# Patient Record
Sex: FEMALE | NOT HISPANIC OR LATINO | URBAN - METROPOLITAN AREA
[De-identification: names, ages, dates, MRNs, and addresses within clinical notes are randomized per-mention and may not be internally consistent; named-entity substitution may affect disease eponyms.]

---

## 2020-07-07 ENCOUNTER — OFFICE VISIT (OUTPATIENT)
Dept: URBAN - METROPOLITAN AREA CLINIC 40 | Facility: CLINIC | Age: 35
End: 2020-07-07

## 2020-07-23 ENCOUNTER — OFFICE VISIT (OUTPATIENT)
Dept: URBAN - METROPOLITAN AREA CLINIC 40 | Facility: CLINIC | Age: 35
End: 2020-07-23
Payer: COMMERCIAL

## 2020-07-23 ENCOUNTER — WEB ENCOUNTER (OUTPATIENT)
Dept: URBAN - METROPOLITAN AREA CLINIC 40 | Facility: CLINIC | Age: 35
End: 2020-07-23

## 2020-07-23 DIAGNOSIS — R10.13 EPIGASTRIC ABDOMINAL PAIN: ICD-10-CM

## 2020-07-23 DIAGNOSIS — K21.9 GERD: ICD-10-CM

## 2020-07-23 DIAGNOSIS — R13.10 DYSPHAGIA: ICD-10-CM

## 2020-07-23 DIAGNOSIS — R14.0 BLOATING: ICD-10-CM

## 2020-07-23 DIAGNOSIS — R19.4 BOWEL HABIT CHANGES: ICD-10-CM

## 2020-07-23 PROCEDURE — G8427 DOCREV CUR MEDS BY ELIG CLIN: HCPCS | Performed by: INTERNAL MEDICINE

## 2020-07-23 PROCEDURE — 99213 OFFICE O/P EST LOW 20 MIN: CPT | Performed by: INTERNAL MEDICINE

## 2020-07-23 PROCEDURE — G8417 CALC BMI ABV UP PARAM F/U: HCPCS | Performed by: INTERNAL MEDICINE

## 2020-07-23 RX ORDER — HYDROXYZINE HYDROCHLORIDE 10 MG/1
TABLET ORAL
Qty: 0 | Refills: 0 | Status: ACTIVE | COMMUNITY
Start: 1900-01-01 | End: 1900-01-01

## 2020-07-23 RX ORDER — HYDROCHLOROTHIAZIDE 12.5 MG/1
TABLET ORAL
Qty: 0 | Refills: 0 | Status: ACTIVE | COMMUNITY
Start: 1900-01-01 | End: 1900-01-01

## 2020-07-23 RX ORDER — AMLODIPINE BESYLATE 5 MG/1
TABLET ORAL
Qty: 0 | Refills: 0 | Status: ACTIVE | COMMUNITY
Start: 1900-01-01 | End: 1900-01-01

## 2020-07-23 RX ORDER — OMEPRAZOLE 20 MG/1
1 CAPSULE 30 MINUTES BEFORE MORNING MEAL CAPSULE, DELAYED RELEASE ORAL ONCE A DAY
Qty: 30 | Refills: 1 | OUTPATIENT
Start: 1900-01-01

## 2020-07-23 RX ORDER — PANTOPRAZOLE SODIUM 40 MG
TAKE 1 TABLET (40 MG) BY ORAL ROUTE ONCE DAILY FOR 90 DAYS TABLET, DELAYED RELEASE (ENTERIC COATED) ORAL 1
OUTPATIENT
Start: 2019-10-03 | End: 2020-09-27

## 2020-07-23 RX ORDER — FAMOTIDINE 40 MG/1
TAKE 1 TABLET BY ORAL ROUTE AT NIGHT FOR 90 DAYS TABLET ORAL 1
Qty: 90 | Refills: 3 | Status: ACTIVE | COMMUNITY
Start: 2019-10-03 | End: 2020-09-27

## 2020-07-23 RX ORDER — OMEPRAZOLE 20 MG/1
CAPSULE, DELAYED RELEASE ORAL
Qty: 0 | Refills: 0 | Status: ACTIVE | COMMUNITY
Start: 1900-01-01 | End: 1900-01-01

## 2020-07-23 RX ORDER — PANTOPRAZOLE SODIUM 40 MG
TAKE 1 TABLET (40 MG) BY ORAL ROUTE ONCE DAILY FOR 90 DAYS TABLET, DELAYED RELEASE (ENTERIC COATED) ORAL 1
Qty: 90 | Refills: 3 | Status: ACTIVE | COMMUNITY
Start: 2019-10-03 | End: 2020-09-27

## 2020-07-23 RX ORDER — METOPROLOL SUCCINATE 25 MG/1
TABLET, EXTENDED RELEASE ORAL
Qty: 0 | Refills: 0 | Status: ACTIVE | COMMUNITY
Start: 1900-01-01 | End: 1900-01-01

## 2020-07-23 NOTE — PHYSICAL EXAM GASTROINTESTINAL
Abdomen , Obese, soft, TTP in epigastric region, nondistended , no guarding or rigidity , no masses palpable , normal bowel sounds , Liver and Spleen , no hepatomegaly present , no hepatosplenomegaly , liver nontender , spleen not palpable, epigastrium tenderness. Rectum: Not examined

## 2020-07-23 NOTE — HPI-TODAY'S VISIT:
Ms. Alanis is a pleasant 35-year-old black female who returns to the office today following her last visit by both myself and Dr. Ambrocio on February 26, 2020.  She has a long history of GERD, chest pain and dysphagia.  Often she describes episodes of epigastric abdominal pain, dysphagia, heartburn, sour eructations, regurgitation with nausea which began earlier this year.  She has complained of progressive solid food dysphagia.  Her heartburn seemed to be improved with PPIs, namely omeprazole.  She is overweight and has been trying to improve her diet, working out at a gym several days a week up until the pandemic.  She has undergone no recent radiographic imaging.  She did have an EGD rescheduled for late 2019 but was not able to keep this appointment. With her last visit, we did reschedule both ordered EGD and ultrasound imaging.  March 10, 2020 she did have a normal right upper quadrant ultrasound.  There was no evidence of cholelithiasis or cholecystitis.  The common bile duct was normal as well as the liver with homogenous echotexture.  No focal lesions and imaged portions of pancreas appeared normal.  She returns today to the office once again with complaint of intermittent dysphagia and GERD symptoms. She is having midepigastric abdominal discomfort and reports that even a glass of wine will reproduce her symptoms of burning.  Bloating.  Some nausea without significant vomiting.  Regurgitation. She has not been on PPI therapy since her previous visit.  She needs a refill for this medication, omeprazole.  She has been taking ibuprofen longterm, at least twice daily for headaches.  Denies rectal bleeding or melena.  Does admit occasionally she may have symptomatic hemorrhoids.  Smoker.  Occasional alcohol intake.  No IV or recreational drug use.  Family history of stomach cancer.  Denies family history of colon cancer.  She is also complaining of alternating bowel habits, fair- to -poor diet.  She is trying to decrease dietary sugars and starch and planning to increase her walking regimen. Due to pandemic, she is unable to get to the gym.  No fever, chills.  Weight stable.

## 2020-08-18 ENCOUNTER — OFFICE VISIT (OUTPATIENT)
Dept: URBAN - METROPOLITAN AREA SURGERY CENTER 30 | Facility: SURGERY CENTER | Age: 35
End: 2020-08-18
Payer: COMMERCIAL

## 2020-08-18 DIAGNOSIS — K31.89 ACQUIRED DEFORMITY OF DUODENUM: ICD-10-CM

## 2020-08-18 DIAGNOSIS — R13.19 CERVICAL DYSPHAGIA: ICD-10-CM

## 2020-08-18 DIAGNOSIS — R12 BURNING REFLUX: ICD-10-CM

## 2020-08-18 PROCEDURE — 43450 DILATE ESOPHAGUS 1/MULT PASS: CPT | Performed by: INTERNAL MEDICINE

## 2020-08-18 PROCEDURE — 43239 EGD BIOPSY SINGLE/MULTIPLE: CPT | Performed by: INTERNAL MEDICINE

## 2020-08-18 PROCEDURE — G8907 PT DOC NO EVENTS ON DISCHARG: HCPCS | Performed by: INTERNAL MEDICINE

## 2020-08-18 RX ORDER — HYDROXYZINE HYDROCHLORIDE 10 MG/1
TABLET ORAL
Qty: 0 | Refills: 0 | Status: ACTIVE | COMMUNITY
Start: 1900-01-01 | End: 1900-01-01

## 2020-08-18 RX ORDER — HYDROCHLOROTHIAZIDE 12.5 MG/1
TABLET ORAL
Qty: 0 | Refills: 0 | Status: ACTIVE | COMMUNITY
Start: 1900-01-01 | End: 1900-01-01

## 2020-08-18 RX ORDER — OMEPRAZOLE 20 MG/1
1 CAPSULE 30 MINUTES BEFORE MORNING MEAL CAPSULE, DELAYED RELEASE ORAL ONCE A DAY
Qty: 30 | Refills: 1 | Status: ACTIVE | COMMUNITY
Start: 1900-01-01

## 2020-08-18 RX ORDER — FAMOTIDINE 40 MG/1
TAKE 1 TABLET BY ORAL ROUTE AT NIGHT FOR 90 DAYS TABLET ORAL 1
Qty: 90 | Refills: 3 | Status: ACTIVE | COMMUNITY
Start: 2019-10-03 | End: 2020-09-27

## 2020-08-18 RX ORDER — METOPROLOL SUCCINATE 25 MG/1
TABLET, EXTENDED RELEASE ORAL
Qty: 0 | Refills: 0 | Status: ACTIVE | COMMUNITY
Start: 1900-01-01 | End: 1900-01-01

## 2020-08-18 RX ORDER — AMLODIPINE BESYLATE 5 MG/1
TABLET ORAL
Qty: 0 | Refills: 0 | Status: ACTIVE | COMMUNITY
Start: 1900-01-01 | End: 1900-01-01

## 2020-08-19 ENCOUNTER — TELEPHONE ENCOUNTER (OUTPATIENT)
Dept: URBAN - METROPOLITAN AREA CLINIC 40 | Facility: CLINIC | Age: 35
End: 2020-08-19

## 2020-08-24 ENCOUNTER — TELEPHONE ENCOUNTER (OUTPATIENT)
Dept: URBAN - METROPOLITAN AREA CLINIC 92 | Facility: CLINIC | Age: 35
End: 2020-08-24

## 2020-08-26 ENCOUNTER — OFFICE VISIT (OUTPATIENT)
Dept: URBAN - METROPOLITAN AREA CLINIC 40 | Facility: CLINIC | Age: 35
End: 2020-08-26
Payer: COMMERCIAL

## 2020-08-26 DIAGNOSIS — K21.9 GERD: ICD-10-CM

## 2020-08-26 DIAGNOSIS — R13.10 DYSPHAGIA: ICD-10-CM

## 2020-08-26 DIAGNOSIS — R10.13 EPIGASTRIC ABDOMINAL PAIN: ICD-10-CM

## 2020-08-26 DIAGNOSIS — R14.0 BLOATING: ICD-10-CM

## 2020-08-26 PROCEDURE — G8417 CALC BMI ABV UP PARAM F/U: HCPCS | Performed by: INTERNAL MEDICINE

## 2020-08-26 PROCEDURE — 99213 OFFICE O/P EST LOW 20 MIN: CPT | Performed by: INTERNAL MEDICINE

## 2020-08-26 PROCEDURE — G8427 DOCREV CUR MEDS BY ELIG CLIN: HCPCS | Performed by: INTERNAL MEDICINE

## 2020-08-26 RX ORDER — OMEPRAZOLE 20 MG/1
1 CAPSULE 30 MINUTES BEFORE MORNING MEAL CAPSULE, DELAYED RELEASE ORAL ONCE A DAY
OUTPATIENT
Start: 1900-01-01

## 2020-08-26 RX ORDER — OMEPRAZOLE 20 MG/1
1 CAPSULE 30 MINUTES BEFORE MORNING MEAL CAPSULE, DELAYED RELEASE ORAL ONCE A DAY
Qty: 30 | Refills: 1 | Status: ACTIVE | COMMUNITY
Start: 1900-01-01

## 2020-08-26 RX ORDER — FAMOTIDINE 40 MG/1
TAKE 1 TABLET BY ORAL ROUTE AT NIGHT FOR 90 DAYS TABLET ORAL 1
OUTPATIENT
Start: 2019-10-03 | End: 2020-09-27

## 2020-08-26 RX ORDER — METOPROLOL SUCCINATE 25 MG/1
TABLET, EXTENDED RELEASE ORAL
Qty: 0 | Refills: 0 | Status: ACTIVE | COMMUNITY
Start: 1900-01-01 | End: 1900-01-01

## 2020-08-26 RX ORDER — HYDROCHLOROTHIAZIDE 12.5 MG/1
TABLET ORAL
Qty: 0 | Refills: 0 | Status: ACTIVE | COMMUNITY
Start: 1900-01-01 | End: 1900-01-01

## 2020-08-26 RX ORDER — HYDROXYZINE HYDROCHLORIDE 10 MG/1
TABLET ORAL
Qty: 0 | Refills: 0 | Status: ACTIVE | COMMUNITY
Start: 1900-01-01 | End: 1900-01-01

## 2020-08-26 RX ORDER — AMLODIPINE BESYLATE 5 MG/1
TABLET ORAL
Qty: 0 | Refills: 0 | Status: ACTIVE | COMMUNITY
Start: 1900-01-01 | End: 1900-01-01

## 2020-08-26 RX ORDER — FAMOTIDINE 40 MG/1
TAKE 1 TABLET BY ORAL ROUTE AT NIGHT FOR 90 DAYS TABLET ORAL 1
Qty: 90 | Refills: 3 | Status: ACTIVE | COMMUNITY
Start: 2019-10-03 | End: 2020-09-27

## 2020-08-26 NOTE — PHYSICAL EXAM GASTROINTESTINAL
Abdomen , Obese, soft, NTTP, nondistended , no guarding or rigidity , no masses palpable , normal bowel sounds , Liver and Spleen , no hepatomegaly present , no hepatosplenomegaly , liver nontender , spleen not palpable, epigastrium tenderness. Rectum: Not examined

## 2020-08-26 NOTE — HPI-TODAY'S VISIT:
Ms. Alanis is a pleasant 35-year-old black female who returns to the office today following recent EGD w/ bx , dilation. She has a long history of GERD, chest pain and dysphagia.  Often she describes episodes of epigastric abdominal pain, dysphagia, heartburn, sour eructations, regurgitation with nausea which began earlier this year.  She has complained of progressive solid food dysphagia.  Her heartburn seemed to be improved with PPIs, namely omeprazole.  She is overweight and has been trying to improve her diet, working out at a gym several days a week up until the pandemic.  She has undergone no recent radiographic imaging.  She did have an EGD rescheduled for late 2019 but was not able to keep this appointment. With her last visit, we did reschedule both ordered EGD and ultrasound imaging.  March 10, 2020 she did have a normal right upper quadrant ultrasound.  There was no evidence of cholelithiasis or cholecystitis.  The common bile duct was normal as well as the liver with homogenous echotexture.  No focal lesions and imaged portions of pancreas appeared normal.  She returns today to the office once again with complaint of intermittent dysphagia and GERD symptoms. She was having midepigastric abdominal discomfort and reported that even a glass of wine would reproduce her symptoms of burning.   She had been taking ibuprofen longterm, at least twice daily for headaches.  Denies rectal bleeding or melena.  Does admit occasionally she may have symptomatic, nonbleeding hemorrhoids.  Smoker.  No IV or recreational drug use.  Family history of stomach cancer.  Denies family history of colon cancer.  She is also complaining of alternating bowel habits, fair- to -poor diet.  She is trying to decrease dietary sugars and starch and planning to increase her walking regimen. Due to pandemic, she is unable to get to the gym.  No fever, chills.  Weight stable. EGD by Dr. Ambrocio on 8/18/20 was normal. No findings to explain dysphagia, but empirically dilated. Gastric biopsies obtained. Reactive gastropathy, no Hpylori, per path. She is feeling much better on omeprazole or Pepcid as needed. She is modifiying diet and has lost two pounds. Denies dysphagia since dilation. No N/V. Denies abdominal pain at this time. No LGI complaints.

## 2021-01-07 ENCOUNTER — OFFICE VISIT (OUTPATIENT)
Dept: URBAN - METROPOLITAN AREA CLINIC 40 | Facility: CLINIC | Age: 36
End: 2021-01-07
Payer: COMMERCIAL

## 2021-01-07 DIAGNOSIS — R14.0 BLOATING: ICD-10-CM

## 2021-01-07 DIAGNOSIS — K21.9 GERD: ICD-10-CM

## 2021-01-07 DIAGNOSIS — R13.10 DYSPHAGIA: ICD-10-CM

## 2021-01-07 DIAGNOSIS — R10.13 EPIGASTRIC ABDOMINAL PAIN: ICD-10-CM

## 2021-01-07 PROCEDURE — G8417 CALC BMI ABV UP PARAM F/U: HCPCS | Performed by: PHYSICIAN ASSISTANT

## 2021-01-07 PROCEDURE — 99213 OFFICE O/P EST LOW 20 MIN: CPT | Performed by: PHYSICIAN ASSISTANT

## 2021-01-07 PROCEDURE — G8427 DOCREV CUR MEDS BY ELIG CLIN: HCPCS | Performed by: PHYSICIAN ASSISTANT

## 2021-01-07 RX ORDER — HYDROCHLOROTHIAZIDE 12.5 MG/1
TABLET ORAL
Qty: 0 | Refills: 0 | Status: ACTIVE | COMMUNITY
Start: 1900-01-01 | End: 1900-01-01

## 2021-01-07 RX ORDER — OMEPRAZOLE 20 MG/1
1 CAPSULE 30 MINUTES BEFORE MORNING MEAL CAPSULE, DELAYED RELEASE ORAL ONCE A DAY
OUTPATIENT
Start: 1900-01-01

## 2021-01-07 RX ORDER — AMLODIPINE BESYLATE 5 MG/1
TABLET ORAL
Qty: 0 | Refills: 0 | Status: ACTIVE | COMMUNITY
Start: 1900-01-01 | End: 1900-01-01

## 2021-01-07 RX ORDER — PANTOPRAZOLE SODIUM 40 MG/1
1 TABLET TABLET, DELAYED RELEASE ORAL ONCE A DAY
Qty: 30 | Refills: 2 | OUTPATIENT
Start: 2021-01-07

## 2021-01-07 RX ORDER — OMEPRAZOLE 20 MG/1
1 CAPSULE 30 MINUTES BEFORE MORNING MEAL CAPSULE, DELAYED RELEASE ORAL ONCE A DAY
Status: ACTIVE | COMMUNITY
Start: 1900-01-01

## 2021-01-07 RX ORDER — METOPROLOL SUCCINATE 25 MG/1
TABLET, EXTENDED RELEASE ORAL
Qty: 0 | Refills: 0 | Status: ACTIVE | COMMUNITY
Start: 1900-01-01 | End: 1900-01-01

## 2021-01-07 RX ORDER — HYDROXYZINE HYDROCHLORIDE 10 MG/1
TABLET ORAL
Qty: 0 | Refills: 0 | Status: ACTIVE | COMMUNITY
Start: 1900-01-01 | End: 1900-01-01

## 2021-01-07 NOTE — HPI-TODAY'S VISIT:
Ms. Alanis is a pleasant 35-year-old black female who returns to the office today following last visit 8/26/20 to review prior EGD w/ bx , dilation. She has a long history of GERD, chest pain and dysphagia.  Often she describes episodes of epigastric abdominal pain, dysphagia, heartburn, sour eructations, regurgitation with nausea which began earlier this year. Her heartburn seemed to be improved with PPIs, namely omeprazole.  She is overweight and has been trying to improve her diet, working out at a gym several days a week up until the pandemic.  She has undergone no recent radiographic imaging.  She did have an EGD rescheduled for late 2019 but was not able to keep this appointment. With her last visit, we did reschedule both ordered EGD and ultrasound imaging.  March 10, 2020 she did have a normal right upper quadrant ultrasound.  There was no evidence of cholelithiasis or cholecystitis.  The common bile duct was normal as well as the liver with homogenous echotexture.  No focal lesions and imaged portions of pancreas appeared normal.  Smoker.  No IV or recreational drug use.  Family history of stomach cancer.  Denies family history of colon cancer.  She is also complaining of alternating bowel habits, fair- to -poor diet.  She is trying to decrease dietary sugars and starch and planning to increase her walking regimen. EGD by Dr. Ambrocio on 8/18/20 was normal. No findings to explain dysphagia, but empirically dilated. Gastric biopsies obtained. Reactive gastropathy, no Hpylori, per path. She  was feeling much better on omeprazole or Pepcid as needed. She is modifiying diet and has lost two pounds. Denies dysphagia since dilation. No N/V. Denies abdominal pain at this time. No LGI complaints. Now recurrent GERD, admits her diet needs improvement.

## 2022-10-10 ENCOUNTER — TELEPHONE ENCOUNTER (OUTPATIENT)
Dept: URBAN - METROPOLITAN AREA CLINIC 40 | Facility: CLINIC | Age: 37
End: 2022-10-10

## 2022-10-13 ENCOUNTER — WEB ENCOUNTER (OUTPATIENT)
Dept: URBAN - METROPOLITAN AREA CLINIC 40 | Facility: CLINIC | Age: 37
End: 2022-10-13

## 2022-10-13 ENCOUNTER — OFFICE VISIT (OUTPATIENT)
Dept: URBAN - METROPOLITAN AREA CLINIC 40 | Facility: CLINIC | Age: 37
End: 2022-10-13

## 2022-10-13 ENCOUNTER — OFFICE VISIT (OUTPATIENT)
Dept: URBAN - METROPOLITAN AREA CLINIC 40 | Facility: CLINIC | Age: 37
End: 2022-10-13
Payer: COMMERCIAL

## 2022-10-13 VITALS
DIASTOLIC BLOOD PRESSURE: 90 MMHG | SYSTOLIC BLOOD PRESSURE: 133 MMHG | HEIGHT: 66 IN | BODY MASS INDEX: 36.96 KG/M2 | WEIGHT: 230 LBS | HEART RATE: 90 BPM

## 2022-10-13 DIAGNOSIS — K21.9 GERD: ICD-10-CM

## 2022-10-13 DIAGNOSIS — R14.0 BLOATING: ICD-10-CM

## 2022-10-13 DIAGNOSIS — R13.10 DYSPHAGIA: ICD-10-CM

## 2022-10-13 DIAGNOSIS — R10.13 EPIGASTRIC ABDOMINAL PAIN: ICD-10-CM

## 2022-10-13 PROCEDURE — 99214 OFFICE O/P EST MOD 30 MIN: CPT | Performed by: PHYSICIAN ASSISTANT

## 2022-10-13 RX ORDER — AMLODIPINE BESYLATE 5 MG/1
TABLET ORAL
Qty: 0 | Refills: 0 | Status: ACTIVE | COMMUNITY
Start: 1900-01-01 | End: 1900-01-01

## 2022-10-13 RX ORDER — PANTOPRAZOLE SODIUM 40 MG/1
1 TABLET TABLET, DELAYED RELEASE ORAL ONCE A DAY
Qty: 30 | Refills: 2

## 2022-10-13 RX ORDER — PANTOPRAZOLE SODIUM 40 MG/1
1 TABLET TABLET, DELAYED RELEASE ORAL ONCE A DAY
Qty: 30 | Refills: 2 | Status: ACTIVE | COMMUNITY
Start: 2021-01-07

## 2022-10-13 RX ORDER — HYDROXYZINE HYDROCHLORIDE 10 MG/1
TABLET ORAL
Qty: 0 | Refills: 0 | Status: ACTIVE | COMMUNITY
Start: 1900-01-01 | End: 1900-01-01

## 2022-10-13 RX ORDER — PANTOPRAZOLE SODIUM 40 MG/1
1 TABLET TABLET, DELAYED RELEASE ORAL ONCE A DAY
Qty: 30 | Refills: 2 | Status: ACTIVE | COMMUNITY

## 2022-10-13 RX ORDER — HYDROCHLOROTHIAZIDE 12.5 MG/1
TABLET ORAL
Qty: 0 | Refills: 0 | Status: ACTIVE | COMMUNITY
Start: 1900-01-01 | End: 1900-01-01

## 2022-10-13 RX ORDER — PANTOPRAZOLE SODIUM 40 MG/1
1 TABLET TABLET, DELAYED RELEASE ORAL ONCE A DAY
Qty: 30 | Refills: 2 | OUTPATIENT

## 2022-10-13 RX ORDER — METOPROLOL SUCCINATE 25 MG/1
TABLET, EXTENDED RELEASE ORAL
Qty: 0 | Refills: 0 | Status: ACTIVE | COMMUNITY
Start: 1900-01-01 | End: 1900-01-01

## 2022-10-13 NOTE — PHYSICAL EXAM CONSTITUTIONAL:
The patient presents today for a non-sedated procedure in the pain clinic.  A full H&P (History and Physical) is not required for this type of procedure.  I have reviewed the patient's record and assessed the patient, and have determined that it is appropriate to proceed with the scheduled procedure. There are also no changes from the time of the patient's office visit.     well developed, obese , in no acute distress , ambulating without difficulty , normal communication ability

## 2022-10-13 NOTE — HPI-TODAY'S VISIT:
Ms. Alanis is a pleasant 37-year-old black female who returns to the office today following last visit 1/7/21 to review prior EGD w/ bx , dilation. She has a long history of GERD, chest pain and dysphagia.  Often she describes episodes of epigastric abdominal pain, dysphagia, heartburn, sour eructations, regurgitation with nausea which began earlier this year. Her heartburn seemed to be improved with PPIs, namely omeprazole.  She is obese and has been trying to improve her diet, working out at a gym several days a week up until the pandemic.  She has undergone no recent radiographic imaging.  She did have an EGD rescheduled for late 2019 but was not able to keep this appointment. With her last visit, we did reschedule both ordered EGD and ultrasound imaging.  March 10, 2020 she did have a normal right upper quadrant ultrasound.  There was no evidence of cholelithiasis or cholecystitis.  The common bile duct was normal as well as the liver with homogenous echotexture.  No focal lesions and imaged portions of pancreas appeared normal.  Smoker.  No IV or recreational drug use.  Family history of stomach cancer.  Denies family history of colon cancer.  She is also complaining of alternating bowel habits, fair- to -poor diet.  She is trying to decrease dietary sugars and starch and planning to increase her walking regimen. EGD by Dr. Ambrocio on 8/18/20 was normal. No findings to explain dysphagia, but empirically dilated. Gastric biopsies obtained. Reactive gastropathy, no Hpylori, per path. She  was feeling much better on omeprazole or Pepcid as needed.  She returns to the office today after weight gain following a right ankle fracture, being followed by orthopedic provider.  She was taking NSAIDs at the time and was not changing her diet much.  Tries to eat healthier overall but has very little physical activity recently since this fracture in April.  She is compliant with pantoprazole 40 mg daily but still having symptoms.  No nausea, vomiting or dysphagia reported.  Denies any regular use of NSAIDs currently.  No rectal bleeding or melena.  She does occasionally have some mid abdominal and epigastric pain, nonradiating.

## 2022-11-15 ENCOUNTER — OFFICE VISIT (OUTPATIENT)
Dept: URBAN - METROPOLITAN AREA CLINIC 40 | Facility: CLINIC | Age: 37
End: 2022-11-15

## 2022-11-15 RX ORDER — AMLODIPINE BESYLATE 5 MG/1
TABLET ORAL
Qty: 0 | Refills: 0 | Status: ACTIVE | COMMUNITY
Start: 1900-01-01 | End: 1900-01-01

## 2022-11-15 RX ORDER — HYDROCHLOROTHIAZIDE 12.5 MG/1
TABLET ORAL
Qty: 0 | Refills: 0 | Status: ACTIVE | COMMUNITY
Start: 1900-01-01 | End: 1900-01-01

## 2022-11-15 RX ORDER — METOPROLOL SUCCINATE 25 MG/1
TABLET, EXTENDED RELEASE ORAL
Qty: 0 | Refills: 0 | Status: ACTIVE | COMMUNITY
Start: 1900-01-01 | End: 1900-01-01

## 2022-11-15 RX ORDER — PANTOPRAZOLE SODIUM 40 MG/1
1 TABLET TABLET, DELAYED RELEASE ORAL ONCE A DAY
Qty: 30 | Refills: 2

## 2022-11-15 RX ORDER — PANTOPRAZOLE SODIUM 40 MG/1
1 TABLET TABLET, DELAYED RELEASE ORAL ONCE A DAY
Qty: 30 | Refills: 2 | Status: ACTIVE | COMMUNITY

## 2022-11-15 RX ORDER — HYDROXYZINE HYDROCHLORIDE 10 MG/1
TABLET ORAL
Qty: 0 | Refills: 0 | Status: ACTIVE | COMMUNITY
Start: 1900-01-01 | End: 1900-01-01

## 2022-11-16 ENCOUNTER — DASHBOARD ENCOUNTERS (OUTPATIENT)
Age: 37
End: 2022-11-16

## 2022-11-17 ENCOUNTER — OFFICE VISIT (OUTPATIENT)
Dept: URBAN - METROPOLITAN AREA CLINIC 40 | Facility: CLINIC | Age: 37
End: 2022-11-17

## 2022-11-17 RX ORDER — METOPROLOL SUCCINATE 25 MG/1
TABLET, EXTENDED RELEASE ORAL
Qty: 0 | Refills: 0 | Status: ACTIVE | COMMUNITY
Start: 1900-01-01 | End: 1900-01-01

## 2022-11-17 RX ORDER — PANTOPRAZOLE SODIUM 40 MG/1
1 TABLET TABLET, DELAYED RELEASE ORAL ONCE A DAY
Qty: 30 | Refills: 2 | Status: ACTIVE | COMMUNITY

## 2022-11-17 RX ORDER — PANTOPRAZOLE SODIUM 40 MG/1
1 TABLET TABLET, DELAYED RELEASE ORAL ONCE A DAY
Qty: 30 | Refills: 2

## 2022-11-17 RX ORDER — HYDROXYZINE HYDROCHLORIDE 10 MG/1
TABLET ORAL
Qty: 0 | Refills: 0 | Status: ACTIVE | COMMUNITY
Start: 1900-01-01 | End: 1900-01-01

## 2022-11-17 RX ORDER — HYDROCHLOROTHIAZIDE 12.5 MG/1
TABLET ORAL
Qty: 0 | Refills: 0 | Status: ACTIVE | COMMUNITY
Start: 1900-01-01 | End: 1900-01-01

## 2022-11-17 RX ORDER — AMLODIPINE BESYLATE 5 MG/1
TABLET ORAL
Qty: 0 | Refills: 0 | Status: ACTIVE | COMMUNITY
Start: 1900-01-01 | End: 1900-01-01

## 2022-11-17 NOTE — HPI-TODAY'S VISIT:
Ms. Alanis is a pleasant 37-year-old black female who returns to the office today following last visit 10/13/22. She has a long history of GERD, chest pain and dysphagia.  Often she describes episodes of epigastric abdominal pain, dysphagia, heartburn, sour eructations, regurgitation with nausea which began earlier this year. Her heartburn seemed to be improved with PPIs, namely omeprazole.  She is obese and has been trying to improve her diet, working out at a gym several days a week up until the pandemic.  She has undergone no recent radiographic imaging.  She did have an EGD rescheduled for late 2019 but was not able to keep this appointment. With her last visit, we did reschedule both ordered EGD and ultrasound imaging.  March 10, 2020 she did have a normal right upper quadrant ultrasound.  There was no evidence of cholelithiasis or cholecystitis.  The common bile duct was normal as well as the liver with homogenous echotexture.  No focal lesions and imaged portions of pancreas appeared normal.  Smoker.  No IV or recreational drug use.  Family history of stomach cancer.  Denies family history of colon cancer.  She is also complaining of alternating bowel habits, fair- to -poor diet.  She is trying to decrease dietary sugars and starch and planning to increase her walking regimen. EGD by Dr. Ambrocio on 8/18/20 was normal. No findings to explain dysphagia, but empirically dilated. Gastric biopsies obtained. Reactive gastropathy, no Hpylori, per path. She  was feeling much better on omeprazole or Pepcid as needed.  She returns to the office today after weight gain following a right ankle fracture, being followed by orthopedic provider.  She was taking NSAIDs at the time and was not changing her diet much.  Tries to eat healthier overall but has very little physical activity recently since this fracture in April.  She is compliant with pantoprazole 40 mg daily but still having symptoms.  No nausea, vomiting or dysphagia reported.  Denies any regular use of NSAIDs currently.  No rectal bleeding or melena.  She does occasionally have some mid abdominal and epigastric pain, nonradiating.

## 2022-12-15 ENCOUNTER — ERX REFILL RESPONSE (OUTPATIENT)
Dept: URBAN - METROPOLITAN AREA CLINIC 40 | Facility: CLINIC | Age: 37
End: 2022-12-15

## 2022-12-15 RX ORDER — PANTOPRAZOLE SODIUM 40 MG/1
1 TABLET TABLET, DELAYED RELEASE ORAL ONCE A DAY
Qty: 30 | Refills: 2 | OUTPATIENT

## 2022-12-15 RX ORDER — PANTOPRAZOLE 40 MG/1
TAKE 1 TABLET ORALLY ONCE A DAY 30 DAY(S) TABLET, DELAYED RELEASE ORAL
Qty: 30 TABLET | Refills: 2 | OUTPATIENT